# Patient Record
Sex: MALE | Race: WHITE | Employment: OTHER | URBAN - METROPOLITAN AREA
[De-identification: names, ages, dates, MRNs, and addresses within clinical notes are randomized per-mention and may not be internally consistent; named-entity substitution may affect disease eponyms.]

---

## 2018-04-07 ENCOUNTER — HOSPITAL ENCOUNTER (EMERGENCY)
Facility: CLINIC | Age: 63
Discharge: HOME | End: 2018-04-07
Attending: EMERGENCY MEDICINE | Admitting: EMERGENCY MEDICINE
Payer: COMMERCIAL

## 2018-04-07 VITALS
TEMPERATURE: 98 F | RESPIRATION RATE: 16 BRPM | DIASTOLIC BLOOD PRESSURE: 89 MMHG | HEART RATE: 71 BPM | OXYGEN SATURATION: 99 % | BODY MASS INDEX: 26.5 KG/M2 | SYSTOLIC BLOOD PRESSURE: 136 MMHG | WEIGHT: 190 LBS

## 2018-04-07 DIAGNOSIS — H57.12 EYE PAIN, LEFT: ICD-10-CM

## 2018-04-07 DIAGNOSIS — T15.92XA FOREIGN BODY OF LEFT EYE, INITIAL ENCOUNTER: ICD-10-CM

## 2018-04-07 PROCEDURE — 99282 EMERGENCY DEPT VISIT SF MDM: CPT

## 2018-04-07 RX ORDER — TETRACAINE HYDROCHLORIDE 5 MG/ML
2 SOLUTION OPHTHALMIC ONCE
Status: DISCONTINUED | OUTPATIENT
Start: 2018-04-07 | End: 2018-04-08 | Stop reason: HOSPADM

## 2018-04-07 ASSESSMENT — VISUAL ACUITY
OD: 20/20
OS: 20/20

## 2018-04-07 ASSESSMENT — ENCOUNTER SYMPTOMS: EYE PAIN: 1

## 2018-04-07 NOTE — ED AVS SNAPSHOT
[facility] Emergency Department    [address] [facility] [address]    Phone:  [phone]    Fax:  [phone]                                       Solitario Gonsalves   MRN: [id number]    Department:  [facility] Emergency Department   Date of Visit:  4/7/2018           Patient Information     Date Of Birth          1955        Your diagnoses for this visit were:     Eye pain, left     Foreign body of left eye, initial encounter        You were seen by Gertrude Culp MD.      Follow-up Information     Follow up with [facility]. Schedule an appointment as soon as possible for a visit in 2 days.    Why:  for recheck of left eye    Contact information:    [address]  [address]  [facility] [address]  [phone]        Go to [facility] Emergency Department.    Specialty:  EMERGENCY MEDICINE    Why:  for worsening vision, redness/swelling around eye, severe pain     Contact information:    [address]  [facility] [address] [phone]        Discharge Instructions         Particle in the Eye  The conjunctiva is a thin membrane in the eye. It covers the white of the eye and the inside of the eyelid. A very small object such as an eyelash or dirt can become trapped under the eyelid. This is called a conjunctival foreign body. This can be very irritating to the eye, no matter how small the object is. If the exam shows that you no longer have a particle in your eye, any discomfort should go away within the next 24 hours.  Home care     Hold a cool compress over the sore eye to relieve pain and swelling.     Put a cool compress on the eye that hurts. A cool compress is a towel soaked in cool water. Do this 3 to 4 times a day. It will help ease redness and swelling.    You can use artificial tears to ease irritation and redness, unless another medicine was prescribed. You can buy artificial tears at [employer].    You can use  over-the-counter pain medicine such as acetaminophen or ibuprofen to control pain, unless another pain medicine was prescribed. If you have chronic liver or kidney disease, or if you have ever had a stomach ulcer or gastrointestinal bleeding, talk with your doctor before using these medicines.    If the foreign body causes a scratch on your cornea, the healthcare provider will likely prescribe an antibiotic eye drop.  Follow-up care  Follow up with your healthcare provider, or as advised.  When to seek medical advice  Contact your healthcare provider right away if any of these occur:    Increased swelling of the eyelid    Increased pain or redness in the eye    Drainage from the eye    Redness in the skin around the eye  Date Last Reviewed: 5/1/2017 2000-2017 [employer]. [address] [place] [address], [place] [address]. All rights reserved. This information is not intended as a substitute for professional medical care. Always follow your healthcare professional's instructions.          24 Hour Appointment Hotline       To make an appointment at any [facility], call [phone]). If you don't have a family doctor or clinic, we will help you find one. [facility] clinics are conveniently located to serve the needs of you and your family.             Review of your medicines      Our records show that you are taking the medicines listed below. If these are incorrect, please call your family doctor or clinic.        Dose / Directions Last dose taken    aspirin 81 MG EC tablet   Dose:  81 mg   Quantity:  90 tablet        Take 1 tablet (81 mg) by mouth daily   Refills:  3        flax seed oil 1000 MG capsule   Dose:  1 capsule        Take 1 capsule (1,000 mg) by mouth daily   Refills:  0        omega 3 1000 MG Caps   Dose:  1 g   Quantity:  90 capsule        Take 1 g by mouth daily   Refills:  0        simvastatin 20 MG tablet   Commonly known as:  ZOCOR   Dose:  30 mg   Quantity:  140  tablet        Take 1.5 tablets (30 mg) by mouth At Bedtime   Refills:  3        triamcinolone 0.1 % cream   Commonly known as:  KENALOG   Quantity:  30 g        Apply sparingly to affected area three times daily for 14 days.   Refills:  0        Vitamin D (Cholecalciferol) 400 UNITS Caps        Refills:  0                Orders Needing Specimen Collection     None      Pending Results     No orders found from 4/5/2018 to 4/8/2018.            Pending Culture Results     No orders found from 4/5/2018 to 4/8/2018.            Pending Results Instructions     If you had any lab results that were not finalized at the time of your Discharge, you can call the ED Lab Result RN at [phone]. You will be contacted by this team for any positive Lab results or changes in treatment. The nurses are available 7 days a week from 10A to 6:30P.  You can leave a message 24 hours per day and they will return your call.        Test Results From Your Hospital Stay               Clinical Quality Measure: Blood Pressure Screening     Your blood pressure was checked while you were in the emergency department today. The last reading we obtained was  BP: 136/89 . Please read the guidelines below about what these numbers mean and what you should do about them.  If your systolic blood pressure (the top number) is less than 120 and your diastolic blood pressure (the bottom number) is less than 80, then your blood pressure is normal. There is nothing more that you need to do about it.  If your systolic blood pressure (the top number) is 120-139 or your diastolic blood pressure (the bottom number) is 80-89, your blood pressure may be higher than it should be. You should have your blood pressure rechecked within a year by a primary care provider.  If your systolic blood pressure (the top number) is 140 or greater or your diastolic blood pressure (the bottom number) is 90 or greater, you may have high blood pressure. High blood pressure is  "treatable, but if left untreated over time it can put you at risk for heart attack, stroke, or kidney failure. You should have your blood pressure rechecked by a primary care provider within the next 4 weeks.  If your provider in the emergency department today gave you specific instructions to follow-up with your doctor or provider even sooner than that, you should follow that instruction and not wait for up to 4 weeks for your follow-up visit.        Thank you for choosing [facility]       Thank you for choosing [facility] for your care. Our goal is always to provide you with excellent care. Hearing back from our patients is one way we can continue to improve our services. Please take a few minutes to complete the written survey that you may receive in the mail after you visit with us. Thank you!        [employer] Information     [employer] lets you send messages to your doctor, view your test results, renew your prescriptions, schedule appointments and more. To sign up, go to [email or web address] . Click on \"Log in\" on the left side of the screen, which will take you to the Welcome page. Then click on \"Sign up Now\" on the right side of the page.     You will be asked to enter the access code listed below, as well as some personal information. Please follow the directions to create your username and password.     Your access code is: [email or web address]  Expires: 2018 11:05 PM     Your access code will  in 90 days. If you need help or a new code, please call your [facility] clinic or [phone].        Care EveryWhere ID     This is your Care EveryWhere ID. This could be used by other organizations to access your [facility] medical records  [id number]        Equal Access to Services     GRISELDA LOMBARDI : Negrito Renee, gianni rizo, cm dela cruz. So [facility] [phone].    ATENCIÓN: Si habla español, tiene a ferrer disposición servicios gratuitos de " asistencia lingüística. Jazmín al [phone].    We comply with applicable federal civil rights laws and [place] laws. We do not discriminate on the basis of race, color, national origin, age, disability, sex, sexual orientation, or gender identity.            After Visit Summary       This is your record. Keep this with you and show to your community pharmacist(s) and doctor(s) at your next visit.

## 2018-04-07 NOTE — ED AVS SNAPSHOT
[facility] Emergency Department    [address]    [facility] [address]    Phone:  [phone]    Fax:  [phone]                                       Solitario Gonsalves   MRN: [id number]    Department:  [facility] Emergency Department   Date of Visit:  4/7/2018           After Visit Summary Signature Page     I have received my discharge instructions, and my questions have been answered. I have discussed any challenges I see with this plan with the nurse or doctor.    ..........................................................................................................................................  Patient/Patient Representative Signature      ..........................................................................................................................................  Patient Representative Print Name and Relationship to Patient    ..................................................               ................................................  Date                                            Time    ..........................................................................................................................................  Reviewed by Signature/Title    ...................................................              ..............................................  Date                                                            Time

## 2018-04-08 NOTE — DISCHARGE INSTRUCTIONS
Particle in the Eye  The conjunctiva is a thin membrane in the eye. It covers the white of the eye and the inside of the eyelid. A very small object such as an eyelash or dirt can become trapped under the eyelid. This is called a conjunctival foreign body. This can be very irritating to the eye, no matter how small the object is. If the exam shows that you no longer have a particle in your eye, any discomfort should go away within the next 24 hours.  Home care     Hold a cool compress over the sore eye to relieve pain and swelling.     Put a cool compress on the eye that hurts. A cool compress is a towel soaked in cool water. Do this 3 to 4 times a day. It will help ease redness and swelling.    You can use artificial tears to ease irritation and redness, unless another medicine was prescribed. You can buy artificial tears at [employer].    You can use over-the-counter pain medicine such as acetaminophen or ibuprofen to control pain, unless another pain medicine was prescribed. If you have chronic liver or kidney disease, or if you have ever had a stomach ulcer or gastrointestinal bleeding, talk with your doctor before using these medicines.    If the foreign body causes a scratch on your cornea, the healthcare provider will likely prescribe an antibiotic eye drop.  Follow-up care  Follow up with your healthcare provider, or as advised.  When to seek medical advice  Contact your healthcare provider right away if any of these occur:    Increased swelling of the eyelid    Increased pain or redness in the eye    Drainage from the eye    Redness in the skin around the eye  Date Last Reviewed: 5/1/2017 2000-2017 [employer]. [address] [place] [address], [place] [address]. All rights reserved. This information is not intended as a substitute for professional medical care. Always follow your healthcare professional's instructions.

## 2018-04-08 NOTE — ED PROVIDER NOTES
History     Chief Complaint:    Eye Pain      HPI   Solitario Gonsalves is a 62 year old male who presents to the ED today with eye pain. The patient states that he was outside today while people were trimming some trees. He states that he felt something go into his eye and rinsed it out soon after wards. Now, he is reporting that he feels like there is still something in his left eye. He states that he tried to rinse it out some more, but has had no relief. He reports that he does not have any pain to his eye, but has some severe discomfort. Of note, the patient states that he wears glasses normally, but does not wear any contact lenses.     Allergies:  No known drug allergies.     Medications:    Kenalog   Zocor      Past Medical History:    History reviewed.  No significant past medical history.      Past Surgical History:    History reviewed. No pertinent past surgical history.     Family History:    CAD - brother, mother, father    Dementia - mother     Social History:  Marital Status:    Presents to the ED with wife   Tobacco Use: never smoker   Alcohol Use: yes   PCP: Lisa Brizuela     Review of Systems   Eyes: Positive for pain.   All other systems reviewed and are negative.      Physical Exam   First Vitals:  BP: 136/89  Pulse: 71  Heart Rate: 71  Temp: 98  F (36.7  C)  Resp: 18  Weight: 86.2 kg (190 lb)  SpO2: 99 %      Physical Exam  Constitutional: Alert, attentive, GCS 15, middle aged male sitting in a chair in no acute distress  HENT: normocephalic, atraumatic, no facial swelling or rash   Eyes: Right eye normal conjunctiva, mild conjunctival injection left eye. Pupils are equal, round, and reactive to light, 3-2 mm bilaterally.  Extraocular movements intact and nonpainful.  Consensual visual field tested are normal bilaterally.  No increased uptake with fluoroescein eye exam.  No foreign body visualized on Woods lamp exam or with eyelid eversion.  Intraocular pressures OD 18, OS 20.  CV:  regular rate and rhythm; no murmurs, rubs or gallups  Chest: Effort normal and breath sounds normal.   MSK: Normal range of motion.   Neurological: Alert, attentive  Skin: Skin is warm and dry. No facial  Rash     Emergency Department Course   Emergency Department Course:  Nursing notes and vitals reviewed.  (1405) I performed an exam of the patient as documented above.    Visual acuity was 20/20 right and 20/20 left.   (9840) I rechecked on the patient and updated them on results.    Findings and plan explained to the patient. Patient discharged home with instructions regarding supportive care, medications, and reasons to return. The importance of close follow-up was reviewed.   Impression & Plan    Medical Decision Making:  Solitario Gonsalves is a 62 year old male who presents with ocular foreign body sensation in the left eye pain.  Differential diagnosis includes ocular foreign body, corneal abrasion, corneal ulcer duration, herpes ophthalmicus, keratitis, endophthalmitis, acute angle closure glaucoma.  Patient's pain started after he had been doing yard work with clippings this afternoon and he already rinsed out his eye extensively before arrival to ED.  His visual acuity is normal.  He has no increased uptake with fluoroscein on wood lamp exam, and there is no evidence of ocular foreign body.  I did chloé his left eyelid, and there is no evidence of eyelid foreign body.  His pain improved after tetracaine administration.  Intraocular pressures are normal, and with normal visual acuity, I doubt acute angle glaucoma.  He was instructed to follow-up with his eye doctor in 2 days for recheck.  He understands strict return precautions if he has a sudden change in his visual acuity, severe eye redness or swelling, or severe eye pain.  Patient felt comfortable with discharge home.      Diagnosis:    ICD-10-CM    1. Eye pain, left H57.12    2. Foreign body of left eye, initial encounter T15.92XA         Disposition:  discharged to home      I, Sugey Bryant, am serving as a scribe on 4/7/2018 at 10:27 PM to personally document services performed by Dr. Culp based on my observations and the provider's statements to me.   4/7/2018   [facility] EMERGENCY DEPARTMENT       Gertrude Culp MD  04/08/18 0159

## 2020-07-25 ENCOUNTER — HOSPITAL ENCOUNTER (OUTPATIENT)
Facility: CLINIC | Age: 65
Setting detail: OBSERVATION
Discharge: HOME | End: 2020-07-26
Attending: EMERGENCY MEDICINE | Admitting: INTERNAL MEDICINE
Payer: COMMERCIAL

## 2020-07-25 ENCOUNTER — APPOINTMENT (OUTPATIENT)
Dept: GENERAL RADIOLOGY | Facility: CLINIC | Age: 65
End: 2020-07-25
Attending: EMERGENCY MEDICINE
Payer: COMMERCIAL

## 2020-07-25 ENCOUNTER — TRANSFERRED RECORDS (OUTPATIENT)
Dept: HEALTH INFORMATION MANAGEMENT | Facility: CLINIC | Age: 65
End: 2020-07-25

## 2020-07-25 DIAGNOSIS — I20.0 UNSTABLE ANGINA (H): Primary | ICD-10-CM

## 2020-07-25 DIAGNOSIS — R07.9 RIGHT-SIDED CHEST PAIN: ICD-10-CM

## 2020-07-25 LAB
ANION GAP SERPL CALCULATED.3IONS-SCNC: 4 MMOL/L (ref 3–14)
BASOPHILS # BLD AUTO: 0 10E9/L (ref 0–0.2)
BASOPHILS NFR BLD AUTO: 0.4 %
BUN SERPL-MCNC: 16 MG/DL (ref 7–30)
CALCIUM SERPL-MCNC: 8.7 MG/DL (ref 8.5–10.1)
CHLORIDE SERPL-SCNC: 106 MMOL/L (ref 94–109)
CO2 SERPL-SCNC: 29 MMOL/L (ref 20–32)
CREAT SERPL-MCNC: 0.86 MG/DL (ref 0.66–1.25)
DIFFERENTIAL METHOD BLD: NORMAL
EOSINOPHIL # BLD AUTO: 0.2 10E9/L (ref 0–0.7)
EOSINOPHIL NFR BLD AUTO: 3.3 %
ERYTHROCYTE [DISTWIDTH] IN BLOOD BY AUTOMATED COUNT: 12.2 % (ref 10–15)
ERYTHROCYTE [DISTWIDTH] IN BLOOD BY AUTOMATED COUNT: 12.3 % (ref 10–15)
GFR SERPL CREATININE-BSD FRML MDRD: >90 ML/MIN/{1.73_M2}
GLUCOSE SERPL-MCNC: 93 MG/DL (ref 70–99)
HCT VFR BLD AUTO: 42.3 % (ref 40–53)
HCT VFR BLD AUTO: 44 % (ref 40–53)
HGB BLD-MCNC: 13.7 G/DL (ref 13.3–17.7)
HGB BLD-MCNC: 14.1 G/DL (ref 13.3–17.7)
IMM GRANULOCYTES # BLD: 0 10E9/L (ref 0–0.4)
IMM GRANULOCYTES NFR BLD: 0.1 %
LYMPHOCYTES # BLD AUTO: 2.3 10E9/L (ref 0.8–5.3)
LYMPHOCYTES NFR BLD AUTO: 33.3 %
MCH RBC QN AUTO: 29.8 PG (ref 26.5–33)
MCH RBC QN AUTO: 30.2 PG (ref 26.5–33)
MCHC RBC AUTO-ENTMCNC: 32 G/DL (ref 31.5–36.5)
MCHC RBC AUTO-ENTMCNC: 32.4 G/DL (ref 31.5–36.5)
MCV RBC AUTO: 93 FL (ref 78–100)
MCV RBC AUTO: 93 FL (ref 78–100)
MONOCYTES # BLD AUTO: 0.8 10E9/L (ref 0–1.3)
MONOCYTES NFR BLD AUTO: 11.2 %
NEUTROPHILS # BLD AUTO: 3.6 10E9/L (ref 1.6–8.3)
NEUTROPHILS NFR BLD AUTO: 51.7 %
NRBC # BLD AUTO: 0 10*3/UL
NRBC BLD AUTO-RTO: 0 /100
PLATELET # BLD AUTO: 229 10E9/L (ref 150–450)
PLATELET # BLD AUTO: 251 10E9/L (ref 150–450)
POTASSIUM SERPL-SCNC: 4.1 MMOL/L (ref 3.4–5.3)
RBC # BLD AUTO: 4.53 10E12/L (ref 4.4–5.9)
RBC # BLD AUTO: 4.73 10E12/L (ref 4.4–5.9)
SODIUM SERPL-SCNC: 139 MMOL/L (ref 133–144)
TROPONIN I SERPL-MCNC: <0.015 UG/L (ref 0–0.04)
TSH SERPL DL<=0.005 MIU/L-ACNC: 1.63 MU/L (ref 0.4–4)
WBC # BLD AUTO: 6.9 10E9/L (ref 4–11)
WBC # BLD AUTO: 9 10E9/L (ref 4–11)

## 2020-07-25 PROCEDURE — 71046 X-RAY EXAM CHEST 2 VIEWS: CPT

## 2020-07-25 PROCEDURE — C9803 HOPD COVID-19 SPEC COLLECT: HCPCS

## 2020-07-25 PROCEDURE — 25000132 ZZH RX MED GY IP 250 OP 250 PS 637: Performed by: INTERNAL MEDICINE

## 2020-07-25 PROCEDURE — G0378 HOSPITAL OBSERVATION PER HR: HCPCS

## 2020-07-25 PROCEDURE — 85520 HEPARIN ASSAY: CPT | Performed by: INTERNAL MEDICINE

## 2020-07-25 PROCEDURE — 85025 COMPLETE CBC W/AUTO DIFF WBC: CPT | Performed by: EMERGENCY MEDICINE

## 2020-07-25 PROCEDURE — 84484 ASSAY OF TROPONIN QUANT: CPT | Mod: 91 | Performed by: INTERNAL MEDICINE

## 2020-07-25 PROCEDURE — 99285 EMERGENCY DEPT VISIT HI MDM: CPT | Mod: 25

## 2020-07-25 PROCEDURE — 99222 1ST HOSP IP/OBS MODERATE 55: CPT | Mod: AI | Performed by: INTERNAL MEDICINE

## 2020-07-25 PROCEDURE — 96374 THER/PROPH/DIAG INJ IV PUSH: CPT

## 2020-07-25 PROCEDURE — 12000000 ZZH R&B MED SURG/OB

## 2020-07-25 PROCEDURE — 84484 ASSAY OF TROPONIN QUANT: CPT | Performed by: EMERGENCY MEDICINE

## 2020-07-25 PROCEDURE — 85027 COMPLETE CBC AUTOMATED: CPT | Performed by: INTERNAL MEDICINE

## 2020-07-25 PROCEDURE — 25000132 ZZH RX MED GY IP 250 OP 250 PS 637: Performed by: EMERGENCY MEDICINE

## 2020-07-25 PROCEDURE — 25000125 ZZHC RX 250: Performed by: INTERNAL MEDICINE

## 2020-07-25 PROCEDURE — 84443 ASSAY THYROID STIM HORMONE: CPT | Performed by: EMERGENCY MEDICINE

## 2020-07-25 PROCEDURE — 25000128 H RX IP 250 OP 636: Performed by: EMERGENCY MEDICINE

## 2020-07-25 PROCEDURE — 36415 COLL VENOUS BLD VENIPUNCTURE: CPT | Performed by: INTERNAL MEDICINE

## 2020-07-25 PROCEDURE — U0003 INFECTIOUS AGENT DETECTION BY NUCLEIC ACID (DNA OR RNA); SEVERE ACUTE RESPIRATORY SYNDROME CORONAVIRUS 2 (SARS-COV-2) (CORONAVIRUS DISEASE [COVID-19]), AMPLIFIED PROBE TECHNIQUE, MAKING USE OF HIGH THROUGHPUT TECHNOLOGIES AS DESCRIBED BY CMS-2020-01-R: HCPCS | Performed by: EMERGENCY MEDICINE

## 2020-07-25 PROCEDURE — 80048 BASIC METABOLIC PNL TOTAL CA: CPT | Performed by: EMERGENCY MEDICINE

## 2020-07-25 PROCEDURE — 93005 ELECTROCARDIOGRAM TRACING: CPT

## 2020-07-25 RX ORDER — NITROGLYCERIN 0.4 MG/1
0.4 TABLET SUBLINGUAL EVERY 5 MIN PRN
Status: DISCONTINUED | OUTPATIENT
Start: 2020-07-25 | End: 2020-07-26 | Stop reason: HOSPADM

## 2020-07-25 RX ORDER — ONDANSETRON 4 MG/1
4 TABLET, ORALLY DISINTEGRATING ORAL EVERY 6 HOURS PRN
Status: DISCONTINUED | OUTPATIENT
Start: 2020-07-25 | End: 2020-07-26 | Stop reason: HOSPADM

## 2020-07-25 RX ORDER — POTASSIUM CL/LIDO/0.9 % NACL 10MEQ/0.1L
10 INTRAVENOUS SOLUTION, PIGGYBACK (ML) INTRAVENOUS
Status: DISCONTINUED | OUTPATIENT
Start: 2020-07-25 | End: 2020-07-26 | Stop reason: HOSPADM

## 2020-07-25 RX ORDER — ACETAMINOPHEN 325 MG/1
650 TABLET ORAL EVERY 4 HOURS PRN
Status: DISCONTINUED | OUTPATIENT
Start: 2020-07-25 | End: 2020-07-26 | Stop reason: HOSPADM

## 2020-07-25 RX ORDER — POTASSIUM CHLORIDE 29.8 MG/ML
20 INJECTION INTRAVENOUS
Status: DISCONTINUED | OUTPATIENT
Start: 2020-07-25 | End: 2020-07-26 | Stop reason: HOSPADM

## 2020-07-25 RX ORDER — HEPARIN SODIUM 10000 [USP'U]/100ML
950 INJECTION, SOLUTION INTRAVENOUS CONTINUOUS
Status: DISCONTINUED | OUTPATIENT
Start: 2020-07-25 | End: 2020-07-26

## 2020-07-25 RX ORDER — LIDOCAINE 40 MG/G
CREAM TOPICAL
Status: DISCONTINUED | OUTPATIENT
Start: 2020-07-25 | End: 2020-07-26 | Stop reason: HOSPADM

## 2020-07-25 RX ORDER — ASPIRIN 81 MG/1
81 TABLET ORAL DAILY
Status: DISCONTINUED | OUTPATIENT
Start: 2020-07-26 | End: 2020-07-26 | Stop reason: HOSPADM

## 2020-07-25 RX ORDER — ONDANSETRON 2 MG/ML
4 INJECTION INTRAMUSCULAR; INTRAVENOUS EVERY 6 HOURS PRN
Status: DISCONTINUED | OUTPATIENT
Start: 2020-07-25 | End: 2020-07-26 | Stop reason: HOSPADM

## 2020-07-25 RX ORDER — AMOXICILLIN 250 MG
2 CAPSULE ORAL 2 TIMES DAILY PRN
Status: DISCONTINUED | OUTPATIENT
Start: 2020-07-25 | End: 2020-07-26 | Stop reason: HOSPADM

## 2020-07-25 RX ORDER — OXYCODONE HYDROCHLORIDE 5 MG/1
5 TABLET ORAL EVERY 6 HOURS PRN
Status: DISCONTINUED | OUTPATIENT
Start: 2020-07-25 | End: 2020-07-26 | Stop reason: HOSPADM

## 2020-07-25 RX ORDER — POTASSIUM CHLORIDE 1.5 G/1.58G
20-40 POWDER, FOR SOLUTION ORAL
Status: DISCONTINUED | OUTPATIENT
Start: 2020-07-25 | End: 2020-07-26 | Stop reason: HOSPADM

## 2020-07-25 RX ORDER — MORPHINE SULFATE 2 MG/ML
1-2 INJECTION, SOLUTION INTRAMUSCULAR; INTRAVENOUS EVERY 4 HOURS PRN
Status: DISCONTINUED | OUTPATIENT
Start: 2020-07-25 | End: 2020-07-26 | Stop reason: HOSPADM

## 2020-07-25 RX ORDER — POTASSIUM CHLORIDE 1500 MG/1
20-40 TABLET, EXTENDED RELEASE ORAL
Status: DISCONTINUED | OUTPATIENT
Start: 2020-07-25 | End: 2020-07-26 | Stop reason: HOSPADM

## 2020-07-25 RX ORDER — AMOXICILLIN 250 MG
1 CAPSULE ORAL 2 TIMES DAILY PRN
Status: DISCONTINUED | OUTPATIENT
Start: 2020-07-25 | End: 2020-07-26 | Stop reason: HOSPADM

## 2020-07-25 RX ORDER — ASPIRIN 325 MG
325 TABLET ORAL ONCE
Status: COMPLETED | OUTPATIENT
Start: 2020-07-25 | End: 2020-07-25

## 2020-07-25 RX ORDER — NALOXONE HYDROCHLORIDE 0.4 MG/ML
.1-.4 INJECTION, SOLUTION INTRAMUSCULAR; INTRAVENOUS; SUBCUTANEOUS
Status: DISCONTINUED | OUTPATIENT
Start: 2020-07-25 | End: 2020-07-26 | Stop reason: HOSPADM

## 2020-07-25 RX ORDER — NITROGLYCERIN 0.4 MG/1
0.4 TABLET SUBLINGUAL EVERY 5 MIN PRN
Status: COMPLETED | OUTPATIENT
Start: 2020-07-25 | End: 2020-07-25

## 2020-07-25 RX ORDER — POTASSIUM CHLORIDE 7.45 MG/ML
10 INJECTION INTRAVENOUS
Status: DISCONTINUED | OUTPATIENT
Start: 2020-07-25 | End: 2020-07-26 | Stop reason: HOSPADM

## 2020-07-25 RX ADMIN — NITROGLYCERIN 0.4 MG: 0.4 TABLET SUBLINGUAL at 15:47

## 2020-07-25 RX ADMIN — HEPARIN SODIUM 950 UNITS/HR: 10000 INJECTION, SOLUTION INTRAVENOUS at 17:36

## 2020-07-25 RX ADMIN — ASPIRIN 325 MG ORAL TABLET 325 MG: 325 PILL ORAL at 14:53

## 2020-07-25 RX ADMIN — SIMVASTATIN 30 MG: 20 TABLET, FILM COATED ORAL at 21:16

## 2020-07-25 RX ADMIN — Medication 4800 UNITS: at 17:36

## 2020-07-25 RX ADMIN — FAMOTIDINE 20 MG: 10 INJECTION, SOLUTION INTRAVENOUS at 21:17

## 2020-07-25 RX ADMIN — NITROGLYCERIN 0.4 MG: 0.4 TABLET SUBLINGUAL at 15:39

## 2020-07-25 RX ADMIN — NITROGLYCERIN 0.4 MG: 0.4 TABLET SUBLINGUAL at 15:31

## 2020-07-25 ASSESSMENT — ACTIVITIES OF DAILY LIVING (ADL): ADLS_ACUITY_SCORE: 17

## 2020-07-25 ASSESSMENT — MIFFLIN-ST. JEOR
SCORE: 1672.15
SCORE: 1699.37

## 2020-07-25 ASSESSMENT — ENCOUNTER SYMPTOMS
SHORTNESS OF BREATH: 0
NUMBNESS: 0

## 2020-07-25 NOTE — PHARMACY-ADMISSION MEDICATION HISTORY
Admission medication history interview status for this patient is complete. See [facility] admission navigator for allergy information, prior to admission medications and immunization status.     Medication history interview done via telephone during Covid-19 pandemic, indicate source(s): Patient  Medication history resources (including written lists, pill bottles, clinic record):None    Changes made to PTA medication list:  Added: none  Deleted: flax seed capsules, omega 3 capsules, kenalog cream   Changed: aspirin from QAM to at bedtime, vitamin D dose     Actions taken by pharmacist (provider contacted, etc):None     Additional medication history information:None    Medication reconciliation/reorder completed by provider prior to medication history?  N   (Y/N)     For patients on insulin therapy: N  (Y/N)      Prior to Admission medications    Medication Sig Last Dose Taking? Auth Provider   aspirin 81 MG EC tablet Take 81 mg by mouth At Bedtime  7/24/2020 at Unknown time Yes Lisa Barrios MD   cholecalciferol (VITAMIN D3) 125 mcg (5000 units) capsule Take 125 mcg by mouth At Bedtime 7/24/2020 at pm Yes Unknown, Entered By History   simvastatin (ZOCOR) 20 MG tablet Take 1.5 tablets (30 mg) by mouth At Bedtime 7/24/2020 at pm Yes Lisa Barrios MD

## 2020-07-25 NOTE — ED NOTES
[facility]  ED Nurse Handoff Report    Solitario Gonsalves is a 64 year old male   ED Chief complaint: Chest Pain and Arm Pain  . ED Diagnosis:   Final diagnoses:   Right-sided chest pain   Unstable angina (H)     Allergies: No Known Allergies    Code Status: Full Code  Activity level - Baseline/Home:  Independent. Activity Level - Current:   Independent. Lift room needed: No. Bariatric: No   Needed: No   Isolation: No. Infection: COVID19 pending    Vital Signs:   Vitals:    07/25/20 1720 07/25/20 1730 07/25/20 1750 07/25/20 1800   BP: 106/72 104/70 96/68 116/77   Pulse:  54  58   Resp:       Temp:       TempSrc:       SpO2: 98% 97% 99% 99%   Weight:       Height:           Cardiac Rhythm:  ,   Cardiac  Cardiac Rhythm: Normal sinus rhythm  Pain level: 0-10 Pain Scale: 4  Patient confused: No. Patient Falls Risk: Yes.   Elimination Status: Has voided   Patient Report - Initial Complaint: Chest Pain. Focused Assessment: Pt presents with sudden unset of right sided chest pain that developed yesterday. Pain is described as dull pressure that's worse with movement and intermittently radiates to bilateral arms. Pain is exacerbated by deep breaths. Pt has known cardiac history in the family prompting his visit today. ABCs intact A&Ox4.   Tests Performed:   Labs Ordered and Resulted from Time of ED Arrival Up to the Time of Departure from the ED   BASIC METABOLIC PANEL   TROPONIN I   TSH WITH FREE T4 REFLEX   CBC WITH PLATELETS DIFFERENTIAL   COVID-19 VIRUS (CORONAVIRUS) BY PCR   PLATELETS MONITORED PER HEPARIN TREATMENT PROTOCOL (FOR MEANINGFUL USE   CARDIAC CONTINUOUS MONITORING   MEASURE WEIGHT   NOTIFY PHYSICIAN   NOTIFY PHYSICIAN     XR Chest 2 Views   Final Result   IMPRESSION: There are no acute infiltrates. The cardiac silhouette is   not enlarged. Pulmonary vasculature is unremarkable.      LUIS PEREZ MD        Treatments provided: SL Nitroglycerin, PO ASA, IV Heparin (see MAR)  Family  Comments: Wife at bedside  OBS brochure/video discussed/provided to patient:  Yes  ED Medications:   Medications   heparin 25,000 units in 0.45% NaCl 250 mL ANTICOAGULANT  infusion (950 Units/hr Intravenous New Bag 7/25/20 1016)   aspirin (ASA) tablet 325 mg (325 mg Oral Given 7/25/20 1453)   nitroGLYcerin (NITROSTAT) sublingual tablet 0.4 mg (0.4 mg Sublingual Given 7/25/20 1347)   heparin ANTICOAGULANT  Loading Dose bolus dose from infusion pump 4,800 Units (4,800 Units Intravenous Given 7/25/20 4206)     Drips infusing:  No  For the majority of the shift, the patient's behavior Green. Interventions performed were none.    Sepsis treatment initiated: No       ED Nurse Name/Phone Number: Ijeoma Frances RN,   6:16 PM    RECEIVING UNIT ED HANDOFF REVIEW    Above ED Nurse Handoff Report was reviewed: Yes  Reviewed by: Gordon Duran RN on July 25, 2020 at 6:30 PM

## 2020-07-25 NOTE — ED PROVIDER NOTES
History     Chief Complaint:  Chest Pain and Arm Pain       HPI   Solitario Gonsalves is a 64 year old male who presents with chest pain. The patient developed intermittent right upper chest pain yesterday. He states that the pain is a pressure pain that radiates to both of his arms. The chest pain is worse with movement. Due to the patient's extensive family cardiac history, he decided to come into the ER for evaluation. He denies any shortness of breath, numbness, tingling, leg swelling,    Cardiac/PE/DVT Risk Factors:  History of hypertension - no  History of hyperlipidemia - yes  History of diabetes - no  History of smoking - no  Personal history of PE/DVT - no  Family history of PE/DVT - no  Family history of heart complications - yes  Recent travel - no  Recent surgery - no  Other immobilizations - no  Cancer - no     Allergies:  No Known Allergies     Medications:    aspirin 81 MG EC tablet  simvastatin    Past Medical History:    Hyperlipidemia      Past Surgical History:    Surgical history reviewed. No pertinent surgical history.    Family History:    Coronary artery disease   Heart disease   Dementia     Social History:  Smoking Status: Never Smoker  Alcohol Use: Yes  Drug Use: No  PCP: Lisa Barrios     Review of Systems   Respiratory: Negative for shortness of breath.    Cardiovascular: Positive for chest pain.   Neurological: Negative for numbness.        No tingling   All other systems reviewed and are negative.    Physical Exam     Patient Vitals for the past 24 hrs:   BP Temp Temp src Pulse Heart Rate Resp SpO2 Height Weight   07/25/20 1900 -- -- -- -- -- -- -- 1.829 m (6') --   07/25/20 1859 110/68 98.1  F (36.7  C) Oral -- 73 16 98 % -- 86.6 kg (191 lb)   07/25/20 1846 -- -- -- -- -- 16 -- -- --   07/25/20 1800 116/77 -- -- 58 53 -- 99 % -- --   07/25/20 1750 96/68 -- -- -- 60 -- 99 % -- --   07/25/20 1730 104/70 -- -- 54 55 -- 97 % -- --   07/25/20 1720 106/72 -- -- -- 57 -- 98 % -- --    07/25/20 1700 103/69 -- -- 52 63 -- 99 % -- --   07/25/20 1530 (!) 126/92 -- -- 65 61 -- 99 % -- --   07/25/20 1431 (!) 141/91 97.9  F (36.6  C) Oral -- 63 20 100 % 1.829 m (6') 83.9 kg (185 lb)        Physical Exam  General: Alert, appears well-developed and well-nourished. Cooperative.     In mild distress  HEENT:  Head:  Atraumatic  Ears:  External ears are normal  Mouth/Throat:  Oropharynx is without erythema or exudate and mucous membranes are moist.   Eyes:   Conjunctivae normal and EOM are normal. No scleral icterus.  CV:  Normal rate, regular rhythm, normal heart sounds and radial pulses are 2+ and symmetric.  No murmur.  Resp:  Breath sounds are clear bilaterally    Non-labored, no retractions or accessory muscle use  GI:  Abdomen is soft, no distension, no tenderness. No rebound or guarding.  No CVA tenderness bilaterally  MS:  Normal range of motion. No edema.    Normal strength in all 4 extremities.     Back atraumatic.    No midline cervical, thoracic, or lumbar tenderness  Skin:  Warm and dry.  No rash or lesions noted.  Neuro: Alert. Normal strength.  GCS: 15  Psych:  Normal mood and affect.    Emergency Department Course   ECG:  ECG taken at 1451, ECG read at 1451  Sinus bradycardia  Otherwise normal ECG  Rate 57 bpm. WY interval 182 ms. QRS duration 92 ms. QT/QTc 400/389 ms. P-R-T axes 22 10 42.     Imaging:  Radiology findings were communicated with the patient who voiced understanding of the findings.    XR Chest 2 Views  Final Result  IMPRESSION: There are no acute infiltrates. The cardiac silhouette is  not enlarged. Pulmonary vasculature is unremarkable.  LUIS PEREZ MD  Reading per radiology       Laboratory:  Laboratory findings were communicated with the patient who voiced understanding of the findings.    BMP: AWNL (Creatinine 0.86)     Troponin(1458):  <0.015      TSH with free T4 reflex: 1.63     CBC:  WBC 6.9, HGB 14.1, , o/w WNL     Asymptomatic COVID-19 Virus (Coronavirus)  by PCR Nasopharyngeal swab: pending      Interventions:  1453  mg oral   1531 Nitrostat 0.4 mg sublingual   1539 Nitrostat 0.4 mg sublingual   1547 Nitrostat 0.4 mg sublingual   1736 Heparin loading dose 4800 units IV  1736 Heparin 25,000 units infusion 950 units/hr IV    Emergency Department Course:  Past medical records, nursing notes, and vitals reviewed.    1452 I performed an exam of the patient as documented above.    IV was inserted and blood was drawn for laboratory testing, results above.     The patient was sent for imaging while in the emergency department, results above.       1606 I spoke with Dr. Desai of the Cardiology service regarding patient's presentation, findings, and plan of care.       Patient rechecked and updated.      1738 I spoke with Dr. Juarez of the Hospitalist service from [facility] regarding patient's presentation, findings, and plan of care.       Findings and plan explained to the Patient who consents to admission. Discussed the patient with Dr. Juarez, who will admit the patient to a cardiac telemetry bed for further monitoring, evaluation, and treatment.        HEART Score  Background  Calculates the overall risk of adverse event in patient's presenting with chest pain.  Based on 5 criteria (each assigned 0-2 points) including suspiciousness of history, EKG, age, risk factors and troponin.    Data  64 year old male  has Pure hypercholesterolemia and Sebaceous cyst on their problem list.   reports that he has never smoked. He does not have any smokeless tobacco history on file.  family history includes Coronary Artery Disease in his brother, father, and mother; Dementia in his mother.  Lab Results   Component Value Date    TROPI <0.015 07/25/2020     Criteria   0-2 points for each of 5 items (maximum of 10 points):  Score 2- History highly suspicious for coronary syndrome  Score 0- EKG Normal  Score 1- Age 45 to 65 years old  Score 1- One to 2 risk factors for  atherosclerotic disease  Score 0- Within normal limits for troponin levels  Interpretation  Risk of adverse outcome  Heart Score: 4  Total Score 4-6- Adverse Outcome Risk 20.3% - Supports admission with standard rule-out management -serial troponins and stress testing    Impression & Plan   Covid-19  Solitario Gonsalves was evaluated during a global COVID-19 pandemic, which necessitated consideration that the patient might be at risk for infection with the SARS-CoV-2 virus that causes COVID-19.   Applicable protocols for evaluation were followed during the patient's care.   COVID-19 was considered as part of the patient's evaluation. The plan for testing is:  a test was obtained during this visit.    Medical Decision Making:  Solitario Gonsalves is a 64 year old male who presents with chest pain.  His history and risk factor analysis are significant for unstable angina.  The workup in the Emergency Department (see above for cardiac enzymes and EKG) is negative thus far.  My clinical suspicion of acute coronary syndrome is high enough to warrant further therapy and investigation.  I will admit the patient  to medicine service for further workup.  The patient is pain free after nitroglycerin and aspirin.  After discussing with him the risks and benefits of heparinization and given lack of contraindication to this therapy, heparin bolus and drip were started given suspicion of acute coronary syndrome.      There is no clinical, laboratory, or radiographic evidence of pulmonary embolism, AAA, aortic dissection, pneumonia or pneumothorax.     He agrees to be admitted and all questions were answered.    Discharge Diagnosis:    ICD-10-CM    1. Unstable angina (H)  I20.0 Asymptomatic COVID-19 Virus (Coronavirus) by PCR   2. Right-sided chest pain  R07.9        Disposition:  Admitted.    Scribe Disclosure:  Brennan BAIRD, am serving as a scribe at 2:52 PM on 7/25/2020 to document services personally performed by Malcom Yanez MD  based on my observations and the provider's statements to me.      7/25/2020   Malcom Yanez MD White, Scott, MD  07/25/20 1902

## 2020-07-25 NOTE — ED TRIAGE NOTES
"Pt has chest pain \"botherwsome\" in right upper chest since yesterday.  Pain radiates to both arms.  "

## 2020-07-26 ENCOUNTER — APPOINTMENT (OUTPATIENT)
Dept: CARDIOLOGY | Facility: CLINIC | Age: 65
End: 2020-07-26
Attending: INTERNAL MEDICINE
Payer: COMMERCIAL

## 2020-07-26 VITALS
HEIGHT: 72 IN | OXYGEN SATURATION: 96 % | DIASTOLIC BLOOD PRESSURE: 70 MMHG | HEART RATE: 58 BPM | RESPIRATION RATE: 16 BRPM | WEIGHT: 185.2 LBS | BODY MASS INDEX: 25.09 KG/M2 | TEMPERATURE: 97.6 F | SYSTOLIC BLOOD PRESSURE: 111 MMHG

## 2020-07-26 LAB
CHOLEST SERPL-MCNC: 141 MG/DL
D DIMER PPP FEU-MCNC: 0.3 UG/ML FEU (ref 0–0.5)
ERYTHROCYTE [DISTWIDTH] IN BLOOD BY AUTOMATED COUNT: 12.3 % (ref 10–15)
ERYTHROCYTE [SEDIMENTATION RATE] IN BLOOD BY WESTERGREN METHOD: 6 MM/H (ref 0–20)
HCT VFR BLD AUTO: 42.8 % (ref 40–53)
HDLC SERPL-MCNC: 49 MG/DL
HGB BLD-MCNC: 14.1 G/DL (ref 13.3–17.7)
INTERPRETATION ECG - MUSE: NORMAL
LDLC SERPL CALC-MCNC: 78 MG/DL
LMWH PPP CHRO-ACNC: 0.26 IU/ML
LMWH PPP CHRO-ACNC: 0.59 IU/ML
MCH RBC QN AUTO: 30.1 PG (ref 26.5–33)
MCHC RBC AUTO-ENTMCNC: 32.9 G/DL (ref 31.5–36.5)
MCV RBC AUTO: 91 FL (ref 78–100)
NONHDLC SERPL-MCNC: 92 MG/DL
PLATELET # BLD AUTO: 239 10E9/L (ref 150–450)
RBC # BLD AUTO: 4.69 10E12/L (ref 4.4–5.9)
SARS-COV-2 RNA SPEC QL NAA+PROBE: NOT DETECTED
SPECIMEN SOURCE: NORMAL
TRIGL SERPL-MCNC: 69 MG/DL
TROPONIN I SERPL-MCNC: <0.015 UG/L (ref 0–0.04)
TROPONIN I SERPL-MCNC: <0.015 UG/L (ref 0–0.04)
WBC # BLD AUTO: 7.6 10E9/L (ref 4–11)

## 2020-07-26 PROCEDURE — 99222 1ST HOSP IP/OBS MODERATE 55: CPT | Performed by: INTERNAL MEDICINE

## 2020-07-26 PROCEDURE — 36415 COLL VENOUS BLD VENIPUNCTURE: CPT | Performed by: HOSPITALIST

## 2020-07-26 PROCEDURE — 25500064 ZZH RX 255 OP 636: Performed by: INTERNAL MEDICINE

## 2020-07-26 PROCEDURE — 93350 STRESS TTE ONLY: CPT | Mod: 26 | Performed by: INTERNAL MEDICINE

## 2020-07-26 PROCEDURE — G0378 HOSPITAL OBSERVATION PER HR: HCPCS

## 2020-07-26 PROCEDURE — 85652 RBC SED RATE AUTOMATED: CPT | Performed by: HOSPITALIST

## 2020-07-26 PROCEDURE — 85027 COMPLETE CBC AUTOMATED: CPT | Performed by: INTERNAL MEDICINE

## 2020-07-26 PROCEDURE — 93325 DOPPLER ECHO COLOR FLOW MAPG: CPT | Mod: 26 | Performed by: INTERNAL MEDICINE

## 2020-07-26 PROCEDURE — 40000264 ECHO STRESS ECHOCARDIOGRAM

## 2020-07-26 PROCEDURE — 25000128 H RX IP 250 OP 636: Performed by: INTERNAL MEDICINE

## 2020-07-26 PROCEDURE — 25000132 ZZH RX MED GY IP 250 OP 250 PS 637: Performed by: INTERNAL MEDICINE

## 2020-07-26 PROCEDURE — 85379 FIBRIN DEGRADATION QUANT: CPT | Performed by: HOSPITALIST

## 2020-07-26 PROCEDURE — 93321 DOPPLER ECHO F-UP/LMTD STD: CPT | Mod: 26 | Performed by: INTERNAL MEDICINE

## 2020-07-26 PROCEDURE — 85520 HEPARIN ASSAY: CPT | Performed by: INTERNAL MEDICINE

## 2020-07-26 PROCEDURE — 84484 ASSAY OF TROPONIN QUANT: CPT | Performed by: INTERNAL MEDICINE

## 2020-07-26 PROCEDURE — 36415 COLL VENOUS BLD VENIPUNCTURE: CPT | Performed by: INTERNAL MEDICINE

## 2020-07-26 PROCEDURE — 80061 LIPID PANEL: CPT | Performed by: INTERNAL MEDICINE

## 2020-07-26 PROCEDURE — 93018 CV STRESS TEST I&R ONLY: CPT | Performed by: INTERNAL MEDICINE

## 2020-07-26 PROCEDURE — 99239 HOSP IP/OBS DSCHRG MGMT >30: CPT | Performed by: HOSPITALIST

## 2020-07-26 PROCEDURE — 93016 CV STRESS TEST SUPVJ ONLY: CPT | Performed by: INTERNAL MEDICINE

## 2020-07-26 RX ORDER — HEPARIN SODIUM 10000 [USP'U]/100ML
700 INJECTION, SOLUTION INTRAVENOUS CONTINUOUS
Status: DISCONTINUED | OUTPATIENT
Start: 2020-07-26 | End: 2020-07-26

## 2020-07-26 RX ADMIN — HEPARIN SODIUM 700 UNITS/HR: 10000 INJECTION, SOLUTION INTRAVENOUS at 01:09

## 2020-07-26 RX ADMIN — ASPIRIN 81 MG: 81 TABLET, COATED ORAL at 08:00

## 2020-07-26 RX ADMIN — HUMAN ALBUMIN MICROSPHERES AND PERFLUTREN 3 ML: 10; .22 INJECTION, SOLUTION INTRAVENOUS at 10:30

## 2020-07-26 ASSESSMENT — ACTIVITIES OF DAILY LIVING (ADL)
ADLS_ACUITY_SCORE: 11

## 2020-07-26 ASSESSMENT — MIFFLIN-ST. JEOR: SCORE: 1673.06

## 2020-07-26 NOTE — PLAN OF CARE
VSS on RA, A/OX4, independent, pt had stress test today, pt diet advanced to reg diet, tolerated well, SR/SB on tele, HR-50-70 bpm, D-Dimer-0.3, trop- neg, reviewed discharge paperwork w/ pt and spouse, both verbalized understanding, pt discharged home via personal vehicle at 1245.

## 2020-07-26 NOTE — PROGRESS NOTES
[facility]    Hospitalist Progress Note  Name: Solitario Gonsalves    MRN: [id number]  Provider:  Solis Heard DO, MPH  Date of Service: 07/26/2020    Summary of Stay: Solitario Gonsalves is a 64 year old male with a history of HLP on ASA admitted on 7/25/2020 with chest pain.      Problem List:   1. Pleuritic chest pain: Not exertional, worse with deep breaths and fairly constant.  Does not sound classic for angina but has strong FHx of CAD.  Continue ASA, statin, heparin drip.  Check STAT d-dimer (to eval for PE) and ESR (to eval for PMR).  Appreciate cardiology consult, stress test planned today.  Continue empiric H2 blocker.    DVT Prophylaxis: Heparin SQ  Code Status: Full Code  Diet: Regular Diet Adult    Solis Catheter: not present  Disposition: Expected discharge in 1 days to home. Goals prior to discharge include stress testing.   Incidental Findings: None.  Family updated today: No     Interval History   Assumed care from previous hospitalist. The history was fully reviewed.  The patient reports doing well. Still mild chest pain at rest. No shortness of breath. No nausea, vomiting, diarrhea, constipation. No fevers. No other specific complaints identified.     -Data reviewed today: I personally reviewed all new labs and imaging results over the last 24 hours.     Physical Exam   Temp: 97.6  F (36.4  C) Temp src: Oral BP: 111/70 Pulse: 58 Heart Rate: 57 Resp: 16 SpO2: 96 % O2 Device: None (Room air)    Vitals:    07/25/20 1431 07/25/20 1859 07/26/20 0600   Weight: 83.9 kg (185 lb) 86.6 kg (191 lb) 84 kg (185 lb 3.2 oz)     Vital Signs with Ranges  Temp:  [97.6  F (36.4  C)-98.1  F (36.7  C)] 97.6  F (36.4  C)  Pulse:  [52-65] 58  Heart Rate:  [51-73] 57  Resp:  [16-20] 16  BP: ()/(61-92) 111/70  SpO2:  [96 %-100 %] 96 %  No intake/output data recorded.    GENERAL: No apparent distress. Awake, alert, and fully oriented.  HEENT: Normocephalic, atraumatic. Extraocular movements  intact.  CARDIOVASCULAR: Regular rate and rhythm without murmurs or rubs. No S3.  PULMONARY: Clear bilaterally.  GASTROINTESTINAL: Soft, non-tender, non-distended. Bowel sounds normoactive.   EXTREMITIES: No cyanosis or clubbing. No edema.  NEUROLOGICAL: CN 2-12 grossly intact, no focal neurological deficits.  DERMATOLOGICAL: No rash, ulcer, bruising, nor jaundice.     Medications     - MEDICATION INSTRUCTIONS -         aspirin  81 mg Oral Daily     simvastatin  30 mg Oral At Bedtime     sodium chloride (PF)  3 mL Intracatheter Q8H     Data     Laboratory:  Recent Labs   Lab 07/26/20  0558 07/25/20  1929 07/25/20  1458   WBC 7.6 9.0 6.9   HGB 14.1 13.7 14.1   HCT 42.8 42.3 44.0   MCV 91 93 93    229 251     Recent Labs   Lab 07/25/20  1458      POTASSIUM 4.1   CHLORIDE 106   CO2 29   ANIONGAP 4   GLC 93   BUN 16   CR 0.86   GFRESTIMATED >90   GFRESTBLACK >90   HOLLIS 8.7     Recent Labs   Lab 07/26/20  0558 07/25/20  2344 07/25/20  1458   TROPI <0.015 <0.015 <0.015     No results for input(s): CULT in the last 168 hours.    Imaging:  Recent Results (from the past 24 hour(s))   XR Chest 2 Views    Narrative    CHEST TWO VIEWS 7/25/2020 4:03 PM     HISTORY: Chest pain, right sided.    COMPARISON: December 16, 2012       Impression    IMPRESSION: There are no acute infiltrates. The cardiac silhouette is  not enlarged. Pulmonary vasculature is unremarkable.    MD Solis CATES DO MPH  Critical access hospital Hospitalist  [address] [place] [address]  Pager: [phone]  07/26/2020

## 2020-07-26 NOTE — CONSULTS
[facility]    Cardiology Consultation     Date of Admission:  7/25/2020    Primary Care Physician   [facility]     Consult Date:  07/26/2020     REASON FOR CONSULTATION:  Chest pain.      REQUESTING PHYSICIAN:  Hospitalist Service.      IMPRESSION:   1.  Noncardiac chest pains.   2.  Hyperlipidemia.      This gentleman's chest pain was very typical of that for coronary artery disease.  I personally interpreted the stress echocardiogram, which was normal.  He admits to being under a lot of stress recently, which I think may be the cause of his symptoms.  He is hemodynamically stable, and I think he could be discharged without further cardiac workup.      HISTORY OF PRESENT ILLNESS:  A very pleasant gentleman with no previous cardiac history.  He does have a strong family history of premature atherosclerotic disease.  Brother has had bypass surgery and stenting.      Stress test from 2012 was normal with good exercise tolerance.  He is a part-time  at a company.  He has been under a lot of stress recently at work.  Two days ago, he experienced some discomfort in his right shoulder.  There was point tenderness.  There was some numbness and tingling down his right arm.  He has had several recurrent episodes of these symptoms, though he is not sure of the duration of these symptoms.  He has otherwise been well.  No fevers.  No nausea or vomiting.  Yesterday, the discomfort spread down his left arm and across his upper chest as well.  With his family history, he is always worried about having heart disease, and he came to the hospital and was admitted for further evaluation and treatment.      Last LDL cholesterol was 78.     Past Medical History   I have reviewed this patient's medical history and updated it with pertinent information if needed.   No past medical history on file.    Past Surgical History   I have reviewed this patient's surgical history and updated it with pertinent  information if needed.  No past surgical history on file.    Prior to Admission Medications   Prior to Admission Medications   Prescriptions Last Dose Informant Patient Reported? Taking?   aspirin 81 MG EC tablet 7/24/2020 at Unknown time  Yes Yes   Sig: Take 81 mg by mouth At Bedtime    cholecalciferol (VITAMIN D3) 125 mcg (5000 units) capsule 7/24/2020 at pm  Yes Yes   Sig: Take 125 mcg by mouth At Bedtime   simvastatin (ZOCOR) 20 MG tablet 7/24/2020 at pm  No Yes   Sig: Take 1.5 tablets (30 mg) by mouth At Bedtime      Facility-Administered Medications: None     Allergies   No Known Allergies    Social History   I have reviewed this patient's social history and updated it with pertinent information if needed. Solitario Gonsalves  reports that he has never smoked. He does not have any smokeless tobacco history on file. He reports current alcohol use. He reports that he does not use drugs.    Family History   I have reviewed this patient's family history and updated it with pertinent information if needed.   Family History   Problem Relation Age of Onset     Coronary Artery Disease Brother         s/p bypass     Dementia Mother      Coronary Artery Disease Mother         s/p bypass     Coronary Artery Disease Father         s/p bypass       Review of Systems   The 10 point Review of Systems is negative other than noted in the HPI or here.     Physical Exam   Temp: 97.6  F (36.4  C) Temp src: Oral BP: 111/70 Pulse: 58 Heart Rate: 57 Resp: 16 SpO2: 96 % O2 Device: None (Room air)    Vital Signs with Ranges  Temp:  [97.6  F (36.4  C)-98.1  F (36.7  C)] 97.6  F (36.4  C)  Pulse:  [52-65] 58  Heart Rate:  [51-73] 57  Resp:  [16] 16  BP: ()/(61-92) 111/70  SpO2:  [96 %-99 %] 96 %  185 lbs 3.2 oz  GENERAL:  Pleasant gentleman in no acute distress.   VITAL SIGNS:  Blood pressure 111/70.  He is afebrile.  Heart rate is normal.   HEENT:  Examination is unremarkable.  Mucous membranes appear normal.  He has no clubbing, no  peripheral central cyanosis.   NECK:  No raised JVP and no thyromegaly.   CARDIAC:  Cardiac apex is not palpable.  Heart sounds are normal.   CHEST:  Symmetrical expansion without the use of accessory muscles.  Breath sounds are normal.   ABDOMEN:  Soft and nontender.  No hepatosplenomegaly.  The abdominal aorta is not palpable.   EXTREMITIES:  Right shoulder examination appears normal.  No significant peripheral edema.  Foot pulses are preserved and intact.   CENTRAL NERVOUS SYSTEM:  Grossly intact.   PSYCHIATRIC:  Mood appears normal.      LABORATORY INVESTIGATIONS:  EKG is normal.  Troponins are normal.  CBC and electrolytes within normal limits as well.  Chest x-ray is normal.        Data   Results for orders placed or performed during the hospital encounter of 07/25/20 (from the past 24 hour(s))   XR Chest 2 Views    Narrative    CHEST TWO VIEWS 7/25/2020 4:03 PM     HISTORY: Chest pain, right sided.    COMPARISON: December 16, 2012       Impression    IMPRESSION: There are no acute infiltrates. The cardiac silhouette is  not enlarged. Pulmonary vasculature is unremarkable.    LUIS PEREZ MD   CBC with platelets   Result Value Ref Range    WBC 9.0 4.0 - 11.0 10e9/L    RBC Count 4.53 4.4 - 5.9 10e12/L    Hemoglobin 13.7 13.3 - 17.7 g/dL    Hematocrit 42.3 40.0 - 53.0 %    MCV 93 78 - 100 fl    MCH 30.2 26.5 - 33.0 pg    MCHC 32.4 31.5 - 36.5 g/dL    RDW 12.3 10.0 - 15.0 %    Platelet Count 229 150 - 450 10e9/L   Troponin I   Result Value Ref Range    Troponin I ES <0.015 0.000 - 0.045 ug/L   Heparin 10a Level   Result Value Ref Range    Heparin 10A Level 0.59 IU/mL   CBC with platelets   Result Value Ref Range    WBC 7.6 4.0 - 11.0 10e9/L    RBC Count 4.69 4.4 - 5.9 10e12/L    Hemoglobin 14.1 13.3 - 17.7 g/dL    Hematocrit 42.8 40.0 - 53.0 %    MCV 91 78 - 100 fl    MCH 30.1 26.5 - 33.0 pg    MCHC 32.9 31.5 - 36.5 g/dL    RDW 12.3 10.0 - 15.0 %    Platelet Count 239 150 - 450 10e9/L   Troponin I   Result Value  Ref Range    Troponin I ES <0.015 0.000 - 0.045 ug/L   Heparin Xa (10a) Level   Result Value Ref Range    Heparin 10A Level 0.26 IU/mL   Lipid panel reflex to direct LDL   Result Value Ref Range    Cholesterol 141 <200 mg/dL    Triglycerides 69 <150 mg/dL    HDL Cholesterol 49 >39 mg/dL    LDL Cholesterol Calculated 78 <100 mg/dL    Non HDL Cholesterol 92 <130 mg/dL   Erythrocyte sedimentation rate auto   Result Value Ref Range    Sed Rate 6 0 - 20 mm/h   D dimer quantitative   Result Value Ref Range    D Dimer 0.3 0.0 - 0.50 ug/ml FEU   Echo Stress Echocardiogram    Narrative    [id number]  [id number]  [id number]  [id number]^NED^OLGA LIDIA^KASSANDRA [facility]  Echocardiography Laboratory  [address] [place] [address]        Name: TIM GONZALEZ  MRN: [id number]  : 1955  Study Date: 2020 10:20 AM  Age: 64 yrs  Gender: Male  Patient Location: [facility]  Reason For Study: Chest Pain  History: Family History,High cholesterol  Ordering Physician: OLGA LIDIA MARINO  Referring Physician: NONE  Performed By: Vee Elizondo     BSA: 2.1 m2  Height: 72 in  Weight: 185 lb  HR: 64  BP: 128/70 mmHg  Medications: Simvastatin,ASA  _____________________________________________________________________________  __        Procedure  Stress Echo Complete. Contrast Optison.  _____________________________________________________________________________  __        Interpretation Summary  The Duke treadmill score was low risk ( >5 Duke score).  This was a normal stress echocardiogram with no evidence of stress-induced  ischemia. Contrast was used without apparent complications.  _____________________________________________________________________________  __     Stress  The patient exercised 10:30.  RPP [id number].  There was a normal BP response to exercise.  Exercise was stopped due to fatigue.  A high workload was achieved.  The patient exhibited no chest pain during exercise.  Target Heart Rate was  achieved.  The Duke treadmill score was low risk ( >5 Duke score).  The EKG portion of this stress test was negative for inducible ischemia (see  echo results below).  A treadmill exercise test according to the Fabrizio protocol was performed.  There was no chest pain or significant ST changes with exercise.  Normal resting wall motion and no stress-induced wall motion abnormality.  The visual ejection fraction is estimated at >70%.  Left ventricular cavity size decreases with exercise.  Global LV systolic function augments with exercise.     Baseline  Normal baseline electrocardiogram.  The patient is in normal sinus rhythm.  Normal resting blood pressure.  Normal left ventricular function and wall motion at rest.  The visual ejection fraction is estimated at 55-60%.  No hemodynamically significant valvular abnormalities on 2D or color flow  imaging.     Stress Results             Protocol:  Fabrizio          Maximum Predicted HR:   157 bpm             Target HR: 133 bpm        % Maximum Predicted HR: 89 %                        Stage  DurationHeart Rate  BP    Comment                             (mm:ss)   (bpm)                    Stage 1   3:00      100   132/70                    Stage 2   3:00      107   140/70                    Stage 3   3:00      125   148/70                    Stage 4   1:30      139   154/70RPP: [id number]                   RecoveryR  6:00      86    120/64               Stress Duration:   10:30 mm:ss *        Recovery Time: 6:00 mm:ss         Maximum Stress HR: 139 bpm *            METS:          12     _____________________________________________________________________________  __  MMode/2D Measurements & Calculations  asc Aorta Diam: 3.8 cm        Doppler Measurements & Calculations  TR max laura: 249.8 cm/sec  TR max P.0 mmHg              _____________________________________________________________________________  __        Report approved by: Yaz Long 2020 11:08 AM                  OLGA LIDIA MARINO MD, formerly [facility]             D: 2020   T: 2020   MT: VIRI      Name:     TIM GONZALEZ   MRN:      [id number]        Account:       [id number]   :      1955           Consult Date:  2020      Document: [id number]

## 2020-07-26 NOTE — PLAN OF CARE
Pt a/o x4, up SBA in room.  Hep gtt running at 9.5.  Trops scheduled for 0000 and 0600.  Cardiology consult.  NPO after 0000.  Pt can make needs known, will continue POC.

## 2020-07-26 NOTE — PLAN OF CARE
VSS. Denies pain & denies SOB. IND in room. A&Ox4. PIV: Hep gtt changed from 9.5ml/hr-->7.0ml/hr, currently infusing. Hep Xa recheck in AM. NPO for Card consult. All Trops are negative. Will continue plan of care.

## 2020-07-27 NOTE — DISCHARGE SUMMARY
Hospitalist Discharge Summary  [facility]    Solitario Gonsalves MRN# [id number]   YOB: 1955 Age: 64 year old     Date of Admission:  7/25/2020  Date of Discharge:  7/26/2020 12:53 PM  Admitting Physician:  Wade Juarez MD  Discharge Physician:  Solis Heard DO  Discharging Service:  Hospitalist     Primary Provider: Clinic, [facility] [place]          Discharge Diagnosis:   1.  Atypical chest pain.   2.  Hyperlipidemia.              Discharge Disposition:   Discharged to home           Allergies:   No Known Allergies           Discharge Medications:   Discharge Medication List as of 7/26/2020 12:34 PM      CONTINUE these medications which have NOT CHANGED    Details   aspirin 81 MG EC tablet Take 81 mg by mouth At Bedtime , Disp-90 tablet,R-3, Historical      cholecalciferol (VITAMIN D3) 125 mcg (5000 units) capsule Take 125 mcg by mouth At Bedtime, Historical      simvastatin (ZOCOR) 20 MG tablet Take 1.5 tablets (30 mg) by mouth At Bedtime, Disp-140 tablet, R-3, E-Prescribe                    Condition on Discharge:   Discharge condition: Stable   Discharge vitals: Blood pressure 111/70, pulse 58, temperature 97.6  F (36.4  C), temperature source Oral, resp. rate 16, height 1.829 m (6'), weight 84 kg (185 lb 3.2 oz), SpO2 96 %.   Code status on discharge: Full Code      BASIC PHYSICAL EXAMINATION:  GENERAL: No apparent distress.  CARDIOVASCULAR: Regular rate and rhythm without murmurs.  PULMONARY: Clear to auscultation bilaterally.   GASTROINTESTINAL: Abdomen soft, non-tender.  EXTREMITIES: No edema, pulses intact.  NEUROLOGIC: No focal deficits.            History of Illness:   See detailed admission note for full details.               Procedures excluding imaging which is summarized below:   Please see details in the electronic medical record.           Consultations:   PHARMACY TO DOSE HEPARIN  CARDIOLOGY IP CONSULT  PHARMACY TO DOSE HEPARIN          Significant  Results:   Results for orders placed or performed during the hospital encounter of 20   XR Chest 2 Views    Narrative    CHEST TWO VIEWS 2020 4:03 PM     HISTORY: Chest pain, right sided.    COMPARISON: 2012       Impression    IMPRESSION: There are no acute infiltrates. The cardiac silhouette is  not enlarged. Pulmonary vasculature is unremarkable.    LUIS PEREZ MD   Echo Stress Echocardiogram    Narrative    [id number]  [id number]  [id number]  [id number]^NED^OLGA LIDIA^KASSANDRA CHIDI           [facility]  Echocardiography Laboratory  [address] [place] [address]        Name: TIM GONZALEZ  MRN: [id number]  : 1955  Study Date: 2020 10:20 AM  Age: 64 yrs  Gender: Male  [facility]  Reason For Study: Chest Pain  History: Family History,High cholesterol  Ordering Physician: OLGA LIDIA MARINO  Referring Physician: NONE  Performed By: Vee Elizondo     BSA: 2.1 m2  Height: 72 in  Weight: 185 lb  HR: 64  BP: 128/70 mmHg  Medications: Simvastatin,ASA  _____________________________________________________________________________  __        Procedure  Stress Echo Complete. Contrast Optison.  _____________________________________________________________________________  __        Interpretation Summary  The Duke treadmill score was low risk ( >5 Duke score).  This was a normal stress echocardiogram with no evidence of stress-induced  ischemia. Contrast was used without apparent complications.  _____________________________________________________________________________  __     Stress  The patient exercised 10:30.  RPP 60318.  There was a normal BP response to exercise.  Exercise was stopped due to fatigue.  A high workload was achieved.  The patient exhibited no chest pain during exercise.  Target Heart Rate was achieved.  The Duke treadmill score was low risk ( >5 Duke score).  The EKG portion of this stress test was negative for inducible ischemia (see  echo results  below).  A treadmill exercise test according to the Fabrizio protocol was performed.  There was no chest pain or significant ST changes with exercise.  Normal resting wall motion and no stress-induced wall motion abnormality.  The visual ejection fraction is estimated at >70%.  Left ventricular cavity size decreases with exercise.  Global LV systolic function augments with exercise.     Baseline  Normal baseline electrocardiogram.  The patient is in normal sinus rhythm.  Normal resting blood pressure.  Normal left ventricular function and wall motion at rest.  The visual ejection fraction is estimated at 55-60%.  No hemodynamically significant valvular abnormalities on 2D or color flow  imaging.     Stress Results             Protocol:  Fabrizio          Maximum Predicted HR:   157 bpm             Target HR: 133 bpm        % Maximum Predicted HR: 89 %                        Stage  DurationHeart Rate  BP    Comment                             (mm:ss)   (bpm)                    Stage 1   3:00      100   132/70                    Stage 2   3:00      107   140/70                    Stage 3   3:00      125   148/70                    Stage 4   1:30      139   154/70RPP: 01775                   RecoveryR  6:00      86    120/64               Stress Duration:   10:30 mm:ss *        Recovery Time: 6:00 mm:ss         Maximum Stress HR: 139 bpm *            METS:          12     _____________________________________________________________________________  __  MMode/2D Measurements & Calculations  asc Aorta Diam: 3.8 cm        Doppler Measurements & Calculations  TR max laura: 249.8 cm/sec  TR max P.0 mmHg              _____________________________________________________________________________  __        Report approved by: Yaz Long 2020 11:08 AM            Transthoracic Echocardiogram Results:  No results found for this or any previous visit (from the past 4320 hour(s)).             Pending Results:   Unresulted  Labs Ordered in the Past 30 Days of this Admission     No orders found from 6/25/2020 to 7/26/2020.                      Discharge Instructions and Follow-Up:   Discharge instructions and follow-up:   Discharge Procedure Orders   Follow-up and recommended labs and tests    Order Comments: Follow up with primary care provider, [facility] [place] [facility], within 7 days for hospital follow- up.  No follow up labs or test are needed.     Activity   Order Comments: Your activity upon discharge: activity as tolerated     Order Specific Question Answer Comments   Is discharge order? Yes      Full Code     Order Specific Question Answer Comments   Code status determined by: Discussion with patient/ legal decision maker      Diet   Order Comments: Follow this diet upon discharge: Orders Placed This Encounter      Regular Diet Adult     Order Specific Question Answer Comments   Is discharge order? Yes              Hospital Course:   This is a 64-year-old essentially healthy male presenting to the [facility] Emergency Department on 07/25 with complaints of chest pain.  The pain is described as pleuritic in nature in the sense that it worsens with deep breathing.  It is not exertional.  It has been ongoing for about 1 or 2 days and fairly constant in nature.  Due to his strong family history for heart disease, he came to the Emergency Department for evaluation.  EKG showed no evidence of ischemia.  Serial troponin levels were undetectable.  Due to his family history and some concerning features, he was initially empirically started on a heparin drip.  D-dimer was negative, indicating PE very unlikely, so CT of the chest was not pursued.  ESR was only 6, so polymyalgia rheumatica seems quite unlikely.  Cardiology was consulted and recommended a stress echocardiogram, which showed no evidence of inducible ischemia and was essentially a normal study.  He currently feels well.  Cardiology believes this is  noncardiac chest pain, and Dr. Mejía and myself think he is safe to discharge home with observation.     The patient was seen, examined, and counseled on this day. The hospitalization and plan of care was reviewed with the patient extensively. All questions were addressed and the patient agreed to follow-up as noted above.      Total time spent in face to face contact with the patient and coordinating discharge was:  35 Minutes    Solis Heard DO, MPH  [facility] Hospitalist  [address] [facility] [place] [address]  Pager: [phone]  2020          D: 2020   T: 2020   MT: [id number]      Name:     TIM GONZALEZ   MRN:      [id number]        Account:        [id number]   :      1955           Admit Date:     2020                                  Discharge Date: 2020      Document: [id number]       cc: [facility]
